# Patient Record
Sex: FEMALE | Race: WHITE | HISPANIC OR LATINO | Employment: UNEMPLOYED | URBAN - METROPOLITAN AREA
[De-identification: names, ages, dates, MRNs, and addresses within clinical notes are randomized per-mention and may not be internally consistent; named-entity substitution may affect disease eponyms.]

---

## 2021-01-12 ENCOUNTER — APPOINTMENT (OUTPATIENT)
Dept: ULTRASOUND IMAGING | Facility: HOSPITAL | Age: 26
End: 2021-01-12

## 2021-01-12 ENCOUNTER — HOSPITAL ENCOUNTER (EMERGENCY)
Facility: HOSPITAL | Age: 26
Discharge: HOME | End: 2021-01-12
Attending: EMERGENCY MEDICINE | Admitting: EMERGENCY MEDICINE

## 2021-01-12 VITALS
WEIGHT: 170 LBS | BODY MASS INDEX: 25.18 KG/M2 | DIASTOLIC BLOOD PRESSURE: 71 MMHG | RESPIRATION RATE: 19 BRPM | TEMPERATURE: 98.5 F | SYSTOLIC BLOOD PRESSURE: 137 MMHG | OXYGEN SATURATION: 100 % | HEART RATE: 89 BPM | HEIGHT: 69 IN

## 2021-01-12 DIAGNOSIS — Z34.91 FIRST TRIMESTER PREGNANCY: Primary | ICD-10-CM

## 2021-01-12 LAB
ABO GROUP BLD: NORMAL
ALBUMIN SERPL-MCNC: 4.6 G/DL (ref 3.5–5.2)
ALBUMIN/GLOB SERPL: 1.8 G/DL
ALP SERPL-CCNC: 68 U/L (ref 39–117)
ALT SERPL W P-5'-P-CCNC: 39 U/L (ref 1–33)
ANION GAP SERPL CALCULATED.3IONS-SCNC: 12.5 MMOL/L (ref 5–15)
AST SERPL-CCNC: 30 U/L (ref 1–32)
BASOPHILS # BLD AUTO: 0.04 10*3/MM3 (ref 0–0.2)
BASOPHILS NFR BLD AUTO: 0.3 % (ref 0–1.5)
BILIRUB SERPL-MCNC: 0.3 MG/DL (ref 0–1.2)
BILIRUB UR QL STRIP: NEGATIVE
BLD GP AB SCN SERPL QL: NEGATIVE
BUN SERPL-MCNC: 6 MG/DL (ref 6–20)
BUN/CREAT SERPL: 14.3 (ref 7–25)
CALCIUM SPEC-SCNC: 9.2 MG/DL (ref 8.6–10.5)
CHLORIDE SERPL-SCNC: 106 MMOL/L (ref 98–107)
CLARITY UR: CLEAR
CLUE CELLS SPEC QL WET PREP: ABNORMAL
CO2 SERPL-SCNC: 20.5 MMOL/L (ref 22–29)
COLOR UR: YELLOW
CREAT SERPL-MCNC: 0.42 MG/DL (ref 0.57–1)
DEPRECATED RDW RBC AUTO: 40.6 FL (ref 37–54)
EOSINOPHIL # BLD AUTO: 0.03 10*3/MM3 (ref 0–0.4)
EOSINOPHIL NFR BLD AUTO: 0.2 % (ref 0.3–6.2)
ERYTHROCYTE [DISTWIDTH] IN BLOOD BY AUTOMATED COUNT: 13.3 % (ref 12.3–15.4)
GFR SERPL CREATININE-BSD FRML MDRD: >150 ML/MIN/1.73
GLOBULIN UR ELPH-MCNC: 2.5 GM/DL
GLUCOSE SERPL-MCNC: 93 MG/DL (ref 65–99)
GLUCOSE UR STRIP-MCNC: NEGATIVE MG/DL
HCG INTACT+B SERPL-ACNC: NORMAL MIU/ML
HCG SERPL QL: POSITIVE
HCT VFR BLD AUTO: 39.7 % (ref 34–46.6)
HGB BLD-MCNC: 13.2 G/DL (ref 12–15.9)
HGB UR QL STRIP.AUTO: NEGATIVE
HOLD SPECIMEN: NORMAL
HYDATID CYST SPEC WET PREP: ABNORMAL
IMM GRANULOCYTES # BLD AUTO: 0.08 10*3/MM3 (ref 0–0.05)
IMM GRANULOCYTES NFR BLD AUTO: 0.6 % (ref 0–0.5)
KETONES UR QL STRIP: NEGATIVE
KOH PREP NAIL: NORMAL
LEUKOCYTE ESTERASE UR QL STRIP.AUTO: NEGATIVE
LIPASE SERPL-CCNC: 13 U/L (ref 13–60)
LYMPHOCYTES # BLD AUTO: 2.02 10*3/MM3 (ref 0.7–3.1)
LYMPHOCYTES NFR BLD AUTO: 14.1 % (ref 19.6–45.3)
MCH RBC QN AUTO: 27.7 PG (ref 26.6–33)
MCHC RBC AUTO-ENTMCNC: 33.2 G/DL (ref 31.5–35.7)
MCV RBC AUTO: 83.4 FL (ref 79–97)
MONOCYTES # BLD AUTO: 0.87 10*3/MM3 (ref 0.1–0.9)
MONOCYTES NFR BLD AUTO: 6.1 % (ref 5–12)
NEUTROPHILS NFR BLD AUTO: 11.32 10*3/MM3 (ref 1.7–7)
NEUTROPHILS NFR BLD AUTO: 78.7 % (ref 42.7–76)
NITRITE UR QL STRIP: NEGATIVE
NRBC BLD AUTO-RTO: 0 /100 WBC (ref 0–0.2)
NUMBER OF DOSES: NORMAL
PH UR STRIP.AUTO: 5.5 [PH] (ref 5–8)
PLATELET # BLD AUTO: 324 10*3/MM3 (ref 140–450)
PMV BLD AUTO: 10.2 FL (ref 6–12)
POTASSIUM SERPL-SCNC: 4.1 MMOL/L (ref 3.5–5.2)
PROT SERPL-MCNC: 7.1 G/DL (ref 6–8.5)
PROT UR QL STRIP: NEGATIVE
RBC # BLD AUTO: 4.76 10*6/MM3 (ref 3.77–5.28)
RH BLD: NEGATIVE
SODIUM SERPL-SCNC: 139 MMOL/L (ref 136–145)
SP GR UR STRIP: 1.01 (ref 1–1.03)
T VAGINALIS SPEC QL WET PREP: ABNORMAL
UROBILINOGEN UR QL STRIP: NORMAL
WBC # BLD AUTO: 14.36 10*3/MM3 (ref 3.4–10.8)
WBC SPEC QL WET PREP: ABNORMAL
WHOLE BLOOD HOLD SPECIMEN: NORMAL
WHOLE BLOOD HOLD SPECIMEN: NORMAL
YEAST GENITAL QL WET PREP: ABNORMAL

## 2021-01-12 PROCEDURE — 84703 CHORIONIC GONADOTROPIN ASSAY: CPT

## 2021-01-12 PROCEDURE — 87591 N.GONORRHOEAE DNA AMP PROB: CPT | Performed by: EMERGENCY MEDICINE

## 2021-01-12 PROCEDURE — 36415 COLL VENOUS BLD VENIPUNCTURE: CPT

## 2021-01-12 PROCEDURE — 76817 TRANSVAGINAL US OBSTETRIC: CPT

## 2021-01-12 PROCEDURE — 86901 BLOOD TYPING SEROLOGIC RH(D): CPT | Performed by: EMERGENCY MEDICINE

## 2021-01-12 PROCEDURE — 99284 EMERGENCY DEPT VISIT MOD MDM: CPT

## 2021-01-12 PROCEDURE — 87210 SMEAR WET MOUNT SALINE/INK: CPT | Performed by: EMERGENCY MEDICINE

## 2021-01-12 PROCEDURE — 93976 VASCULAR STUDY: CPT

## 2021-01-12 PROCEDURE — 85025 COMPLETE CBC W/AUTO DIFF WBC: CPT

## 2021-01-12 PROCEDURE — 81003 URINALYSIS AUTO W/O SCOPE: CPT

## 2021-01-12 PROCEDURE — 86850 RBC ANTIBODY SCREEN: CPT | Performed by: EMERGENCY MEDICINE

## 2021-01-12 PROCEDURE — 84702 CHORIONIC GONADOTROPIN TEST: CPT | Performed by: EMERGENCY MEDICINE

## 2021-01-12 PROCEDURE — 76815 OB US LIMITED FETUS(S): CPT

## 2021-01-12 PROCEDURE — 87220 TISSUE EXAM FOR FUNGI: CPT | Performed by: EMERGENCY MEDICINE

## 2021-01-12 PROCEDURE — 86900 BLOOD TYPING SEROLOGIC ABO: CPT | Performed by: EMERGENCY MEDICINE

## 2021-01-12 PROCEDURE — 87491 CHLMYD TRACH DNA AMP PROBE: CPT | Performed by: EMERGENCY MEDICINE

## 2021-01-12 PROCEDURE — 83690 ASSAY OF LIPASE: CPT

## 2021-01-12 PROCEDURE — 80053 COMPREHEN METABOLIC PANEL: CPT

## 2021-01-12 RX ORDER — SODIUM CHLORIDE 0.9 % (FLUSH) 0.9 %
10 SYRINGE (ML) INJECTION AS NEEDED
Status: DISCONTINUED | OUTPATIENT
Start: 2021-01-12 | End: 2021-01-12 | Stop reason: HOSPADM

## 2021-01-12 NOTE — ED PROVIDER NOTES
EMERGENCY DEPARTMENT ENCOUNTER    Room Number:  10/10  Date of encounter:  1/12/2021  PCP: Provider, No Known  Historian: Patient      HPI:  Chief Complaint: Pelvic pain      Context: Evie Ann is a 25 y.o. female who presents to the ED c/o low abdominal and pelvic pain that started yesterday.  She reports that she has not had a menstrual cycle in the last 2 months.  She reports she has taken 2 pregnancy tests and both of been positive.  She states she has a history of PCOS and was told she would not be able to be pregnant.  She denies any prior pregnancies.  She reports some normal white vaginal discharge.  She denies vaginal bleeding.  She reports nausea for the last several weeks.      PAST MEDICAL HISTORY  Active Ambulatory Problems     Diagnosis Date Noted   • No Active Ambulatory Problems     Resolved Ambulatory Problems     Diagnosis Date Noted   • No Resolved Ambulatory Problems     Past Medical History:   Diagnosis Date   • Polycystic disease, ovaries          PAST SURGICAL HISTORY  History reviewed. No pertinent surgical history.      FAMILY HISTORY  History reviewed. No pertinent family history.      SOCIAL HISTORY  Social History     Socioeconomic History   • Marital status: Single     Spouse name: Not on file   • Number of children: Not on file   • Years of education: Not on file   • Highest education level: Not on file   Tobacco Use   • Smoking status: Current Every Day Smoker   • Smokeless tobacco: Never Used   Substance and Sexual Activity   • Alcohol use: Yes     Frequency: Never     Comment: occ   • Drug use: Never   • Sexual activity: Yes     Partners: Male         ALLERGIES  Patient has no known allergies.        REVIEW OF SYSTEMS  Review of Systems   No upper abdominal pain, no diarrhea, no shortness of air, no chest pain, no palpitations  All systems reviewed and negative except for those discussed in HPI.       PHYSICAL EXAM    I have reviewed the triage vital signs and nursing  notes.    ED Triage Vitals [01/12/21 1451]   Temp Heart Rate Resp BP SpO2   98.5 °F (36.9 °C) 97 16 148/91 100 %      Temp src Heart Rate Source Patient Position BP Location FiO2 (%)   -- -- -- -- --       Physical Exam  GENERAL: Awake, alert, not distressed  HENT: nares patent  EYES: no scleral icterus  CV: regular rhythm, regular rate  RESPIRATORY: normal effort, lungs clear  ABDOMEN: soft, nondistended, minimally tender suprapubic area.  MUSCULOSKELETAL: no deformity  NEURO: alert, moves all extremities, follows commands  SKIN: warm, dry        LAB RESULTS  Recent Results (from the past 24 hour(s))   Comprehensive Metabolic Panel    Collection Time: 01/12/21  4:10 PM    Specimen: Blood   Result Value Ref Range    Glucose 93 65 - 99 mg/dL    BUN 6 6 - 20 mg/dL    Creatinine 0.42 (L) 0.57 - 1.00 mg/dL    Sodium 139 136 - 145 mmol/L    Potassium 4.1 3.5 - 5.2 mmol/L    Chloride 106 98 - 107 mmol/L    CO2 20.5 (L) 22.0 - 29.0 mmol/L    Calcium 9.2 8.6 - 10.5 mg/dL    Total Protein 7.1 6.0 - 8.5 g/dL    Albumin 4.60 3.50 - 5.20 g/dL    ALT (SGPT) 39 (H) 1 - 33 U/L    AST (SGOT) 30 1 - 32 U/L    Alkaline Phosphatase 68 39 - 117 U/L    Total Bilirubin 0.3 0.0 - 1.2 mg/dL    eGFR Non African Amer >150 >60 mL/min/1.73    Globulin 2.5 gm/dL    A/G Ratio 1.8 g/dL    BUN/Creatinine Ratio 14.3 7.0 - 25.0    Anion Gap 12.5 5.0 - 15.0 mmol/L   Lipase    Collection Time: 01/12/21  4:10 PM    Specimen: Blood   Result Value Ref Range    Lipase 13 13 - 60 U/L   hCG, Serum, Qualitative    Collection Time: 01/12/21  4:10 PM    Specimen: Blood   Result Value Ref Range    HCG Qualitative Positive (A) Negative   Light Blue Top    Collection Time: 01/12/21  4:10 PM   Result Value Ref Range    Extra Tube hold for add-on    Green Top (Gel)    Collection Time: 01/12/21  4:10 PM   Result Value Ref Range    Extra Tube Hold for add-ons.    Lavender Top    Collection Time: 01/12/21  4:10 PM   Result Value Ref Range    Extra Tube hold for add-on     CBC Auto Differential    Collection Time: 21  4:10 PM    Specimen: Blood   Result Value Ref Range    WBC 14.36 (H) 3.40 - 10.80 10*3/mm3    RBC 4.76 3.77 - 5.28 10*6/mm3    Hemoglobin 13.2 12.0 - 15.9 g/dL    Hematocrit 39.7 34.0 - 46.6 %    MCV 83.4 79.0 - 97.0 fL    MCH 27.7 26.6 - 33.0 pg    MCHC 33.2 31.5 - 35.7 g/dL    RDW 13.3 12.3 - 15.4 %    RDW-SD 40.6 37.0 - 54.0 fl    MPV 10.2 6.0 - 12.0 fL    Platelets 324 140 - 450 10*3/mm3    Neutrophil % 78.7 (H) 42.7 - 76.0 %    Lymphocyte % 14.1 (L) 19.6 - 45.3 %    Monocyte % 6.1 5.0 - 12.0 %    Eosinophil % 0.2 (L) 0.3 - 6.2 %    Basophil % 0.3 0.0 - 1.5 %    Immature Grans % 0.6 (H) 0.0 - 0.5 %    Neutrophils, Absolute 11.32 (H) 1.70 - 7.00 10*3/mm3    Lymphocytes, Absolute 2.02 0.70 - 3.10 10*3/mm3    Monocytes, Absolute 0.87 0.10 - 0.90 10*3/mm3    Eosinophils, Absolute 0.03 0.00 - 0.40 10*3/mm3    Basophils, Absolute 0.04 0.00 - 0.20 10*3/mm3    Immature Grans, Absolute 0.08 (H) 0.00 - 0.05 10*3/mm3    nRBC 0.0 0.0 - 0.2 /100 WBC   Urinalysis With Microscopic If Indicated (No Culture) - Urine, Clean Catch    Collection Time: 21  4:20 PM    Specimen: Urine, Clean Catch   Result Value Ref Range    Color, UA Yellow Yellow, Straw    Appearance, UA Clear Clear    pH, UA 5.5 5.0 - 8.0    Specific Gravity, UA 1.007 1.005 - 1.030    Glucose, UA Negative Negative    Ketones, UA Negative Negative    Bilirubin, UA Negative Negative    Blood, UA Negative Negative    Protein, UA Negative Negative    Leuk Esterase, UA Negative Negative    Nitrite, UA Negative Negative    Urobilinogen, UA 0.2 E.U./dL 0.2 - 1.0 E.U./dL   hCG, Quantitative, Pregnancy    Collection Time: 21  5:17 PM    Specimen: Blood   Result Value Ref Range    HCG Quantitative 120,822.00 mIU/mL    RhIg Evaluation    Collection Time: 21  5:17 PM    Specimen: Blood   Result Value Ref Range    ABO Type A     RH type Negative     Antibody Screen Negative    Doses of Rh Immune  Globulin    Collection Time: 01/12/21  5:17 PM    Specimen: Blood   Result Value Ref Range    Number of Doses Recommend 1 vial of RhIg    KOH Prep - Swab, Vagina    Collection Time: 01/12/21  6:07 PM    Specimen: Vagina; Swab   Result Value Ref Range    KOH Prep No yeast or hyphal elements seen No yeast or hyphal elements seen   Wet Prep, Genital - Swab, Vagina    Collection Time: 01/12/21  6:07 PM    Specimen: Vagina; Swab   Result Value Ref Range    YEAST No yeast seen No yeast seen    HYPHAL ELEMENTS No Hyphal elements seen No Hyphal elements seen    WBC'S 2+ WBC's seen (A) No WBC's seen    Clue Cells, Wet Prep No Clue cells seen No Clue cells seen    Trichomonas, Wet Prep No Trichomonas seen No Trichomonas seen       Ordered the above labs and independently reviewed the results.        RADIOLOGY  Us Ob Limited 1 + Fetuses    Result Date: 1/12/2021  US OB LIMITED 1 + FETUSES-  INDICATIONS: Pregnancy, pelvic pain  TECHNIQUE: Transabdominal and endovaginal pelvic ultrasound with Doppler assessment of the ovaries.  COMPARISON: None available  FINDINGS:  The gravid uterus measures 9.2 x 5.5 x 6.0 cm. A single intrauterine gestation is demonstrated with yolk sac measuring 3.8 mm, gestational sac measuring 2.5 cm, crown-rump length measuring 1.2 cm. Ultrasound estimated gestational age is 7 weeks, 4 days. Fetal heart rate was measured at 152 bpm. No subchorionic hematoma is noted. The cervix is closed, measures 3.5 cm. Minimal endocervical fluid noted.  The ovaries show normal vascularity. The right ovary measures 3.0 x 2.4 x 3.1 cm, and contains a 2.2 cm presumed corpus luteum cyst. Left ovary measures 2.5 x 1.3 x 2.4 cm, appears unremarkable  No significant pelvic free fluid.        Unremarkable live intrauterine gestation, as described.  This report was finalized on 1/12/2021 7:11 PM by Dr. Vamsi Franco M.D.        I ordered the above noted radiological studies. Reviewed by me and discussed with radiologist.   See dictation for official radiology interpretation.      PROCEDURES    Procedures      MEDICATIONS GIVEN IN ER    Medications - No data to display      PROGRESS, DATA ANALYSIS, CONSULTS, AND MEDICAL DECISION MAKING    All labs have been independently reviewed by me.  All radiology studies have been reviewed by me and discussed with radiologist dictating the report.   EKG's independently viewed and interpreted by me.  Discussion below represents my analysis of pertinent findings related to patient's condition, differential diagnosis, treatment plan and final disposition.        ED Course as of Jan 12 2218   Tue Jan 12, 2021   1805 Pelvic exam with closed cervix, scant white discharge, nontender    [TR]   1914 Received call regarding ultrasound results.  Single intrauterine pregnancy at 7 weeks and 4 days with a heart rate of 152.  No free fluid.    [TR]   1916 Reviewed work-up and finding with the patient.  Answered all questions.    [TR]      ED Course User Index  [TR] Goyo West MD           PPE: Both the patient and I wore a surgical mask throughout the entire patient encounter. I wore protective goggles.     AS OF 22:18 EST VITALS:    BP - 137/71  HR - 89  TEMP - 98.5 °F (36.9 °C)  O2 SATS - 100%        DIAGNOSIS  Final diagnoses:   First trimester pregnancy         DISPOSITION  Discharged home           Goyo West MD  01/12/21 3784

## 2021-01-12 NOTE — ED NOTES
patient from home with c/o abdominal pain that started 2 months ago that has progressively gotten worse over the last day. Patient is unsure if she is pregnant with last menstrual period 2 months ago. Denies any vaginal bleeding at this time.     Amarilys Perez RN  01/12/21 2334

## 2021-01-14 LAB
C TRACH RRNA SPEC QL NAA+PROBE: NEGATIVE
N GONORRHOEA RRNA SPEC QL NAA+PROBE: NEGATIVE

## 2021-02-09 ENCOUNTER — HOSPITAL ENCOUNTER (EMERGENCY)
Facility: HOSPITAL | Age: 26
Discharge: HOME | End: 2021-02-09
Attending: EMERGENCY MEDICINE | Admitting: EMERGENCY MEDICINE

## 2021-02-09 VITALS
HEART RATE: 77 BPM | DIASTOLIC BLOOD PRESSURE: 57 MMHG | TEMPERATURE: 98.8 F | RESPIRATION RATE: 18 BRPM | OXYGEN SATURATION: 100 % | SYSTOLIC BLOOD PRESSURE: 112 MMHG

## 2021-02-09 DIAGNOSIS — L24.4 IRRITANT CONTACT DERMATITIS DUE TO DRUG IN CONTACT WITH SKIN: Primary | ICD-10-CM

## 2021-02-09 PROCEDURE — 99282 EMERGENCY DEPT VISIT SF MDM: CPT

## 2021-02-09 NOTE — ED PROVIDER NOTES
EMERGENCY DEPARTMENT ENCOUNTER    Room Number:  34/34  Date of encounter:  2021  PCP: Provider, No Known  Historian: Patient       HPI:  Chief Complaint: Rash  A complete HPI/ROS/PMH/PSH/SH/FH are unobtainable due to: None    Context: Evie Shi is a 25 y.o. female who presents to the ED c/o rash.  Onset this morning she has been using a new cream to her lower abdomen to prevent stretch marks as she is currently almost 11 weeks pregnant.  .  She developed associated itching that is mild and constant.  Weeks is better or worse.  No associated fever, shortness of breath, swelling, vomiting, abdominal pain, diarrhea.      PAST MEDICAL HISTORY  Active Ambulatory Problems     Diagnosis Date Noted   • No Active Ambulatory Problems     Resolved Ambulatory Problems     Diagnosis Date Noted   • No Resolved Ambulatory Problems     Past Medical History:   Diagnosis Date   • Polycystic disease, ovaries          PAST SURGICAL HISTORY  No past surgical history on file.      FAMILY HISTORY  No family history on file.      SOCIAL HISTORY  Social History     Socioeconomic History   • Marital status: Single     Spouse name: Not on file   • Number of children: Not on file   • Years of education: Not on file   • Highest education level: Not on file   Tobacco Use   • Smoking status: Current Every Day Smoker   • Smokeless tobacco: Never Used   Substance and Sexual Activity   • Alcohol use: Yes     Frequency: Never     Comment: occ   • Drug use: Never   • Sexual activity: Yes     Partners: Male         ALLERGIES  Patient has no known allergies.        REVIEW OF SYSTEMS  Review of Systems     All systems reviewed and negative except for those discussed in HPI.       PHYSICAL EXAM    I have reviewed the triage vital signs and nursing notes.    ED Triage Vitals   Temp Heart Rate Resp BP SpO2   21 0851 21 0851 21 0851 21 0904 21 0851   98.8 °F (37.1 °C) 77 18 112/57 100 %      Temp src  Heart Rate Source Patient Position BP Location FiO2 (%)   -- -- -- -- --              Physical Exam  GENERAL: not distressed  HENT: nares patent  EYES: no scleral icterus  CV: regular rhythm, regular rate  RESPIRATORY: normal effort, clear to auscultation bilaterally  ABDOMEN: soft, nontender  MUSCULOSKELETAL: no deformity  NEURO: alert, moves all extremities, follows commands  SKIN: Erythematous papules to the patient's lower abdomen where she is applying the        LAB RESULTS  No results found for this or any previous visit (from the past 24 hour(s)).    Ordered the above labs and independently reviewed the results.        RADIOLOGY  No Radiology Exams Resulted Within Past 24 Hours    I ordered the above noted radiological studies. Reviewed by me and discussed with radiologist.  See dictation for official radiology interpretation.      PROCEDURES    Procedures      MEDICATIONS GIVEN IN ER    Medications - No data to display      PROGRESS, DATA ANALYSIS, CONSULTS, AND MEDICAL DECISION MAKING    All labs have been independently reviewed by me.  All radiology studies have been reviewed by me and discussed with radiologist dictating the report.   EKG's independently viewed and interpreted by me.  Discussion below represents my analysis of pertinent findings related to patient's condition, differential diagnosis, treatment plan and final disposition.    Patient presents with contact dermatitis the abdomen.  No evidence of anaphylaxis.  She is clinically well-appearing.  Patient also asked to have the sex of the baby determined.  I let her know this cannot be performed in the emergency department.           PPE: Both the patient and I wore a surgical mask throughout the entire patient encounter. I wore protective goggles.     AS OF 09:13 EST VITALS:    BP - 112/57  HR - 77  TEMP - 98.8 °F (37.1 °C)  O2 SATS - 100%        DIAGNOSIS  Final diagnoses:   Irritant contact dermatitis due to drug in contact with skin          DISPOSITION  DISCHARGE    FOLLOW-UP    Llama a cosme obsetetrico para hacer gabriel gustavo si no se te quiten la ronchas dentro de gabriel semana.             Medication List      No changes were made to your prescriptions during this visit.                  Eyad Forrest II, MD  02/09/21 0980

## 2021-02-09 NOTE — ED NOTES
Patient from home with c/o rash on her stomach. Patient is 11 weeks pregnant and has recently started using a new topical cream and is unsure if she is allergic to the cream. Patient is not having any pain at this moment. No fever reported.     Amarilys Perez RN  02/09/21 1015

## 2021-03-12 ENCOUNTER — HOSPITAL ENCOUNTER (EMERGENCY)
Facility: HOSPITAL | Age: 26
Discharge: HOME | End: 2021-03-12
Attending: OBSTETRICS & GYNECOLOGY | Admitting: OBSTETRICS & GYNECOLOGY

## 2021-03-12 ENCOUNTER — APPOINTMENT (OUTPATIENT)
Dept: ULTRASOUND IMAGING | Facility: HOSPITAL | Age: 26
End: 2021-03-12

## 2021-03-12 ENCOUNTER — HOSPITAL ENCOUNTER (EMERGENCY)
Facility: HOSPITAL | Age: 26
End: 2021-03-12

## 2021-03-12 ENCOUNTER — HOSPITAL ENCOUNTER (EMERGENCY)
Facility: HOSPITAL | Age: 26
Discharge: HOME | End: 2021-03-12
Attending: EMERGENCY MEDICINE | Admitting: EMERGENCY MEDICINE

## 2021-03-12 ENCOUNTER — HOSPITAL ENCOUNTER (OUTPATIENT)
Facility: HOSPITAL | Age: 26
End: 2021-03-12
Attending: OBSTETRICS & GYNECOLOGY | Admitting: OBSTETRICS & GYNECOLOGY

## 2021-03-12 VITALS
TEMPERATURE: 97.8 F | DIASTOLIC BLOOD PRESSURE: 58 MMHG | SYSTOLIC BLOOD PRESSURE: 109 MMHG | BODY MASS INDEX: 25.1 KG/M2 | OXYGEN SATURATION: 100 % | HEIGHT: 69 IN | RESPIRATION RATE: 16 BRPM | HEART RATE: 82 BPM

## 2021-03-12 VITALS — OXYGEN SATURATION: 97 % | RESPIRATION RATE: 16 BRPM | HEART RATE: 97 BPM

## 2021-03-12 DIAGNOSIS — O26.892 ABDOMINAL PAIN DURING PREGNANCY IN SECOND TRIMESTER: Primary | ICD-10-CM

## 2021-03-12 DIAGNOSIS — R10.9 ABDOMINAL PAIN DURING PREGNANCY IN SECOND TRIMESTER: Primary | ICD-10-CM

## 2021-03-12 LAB
ALBUMIN SERPL-MCNC: 4.1 G/DL (ref 3.5–5.2)
ALBUMIN/GLOB SERPL: 1.2 G/DL
ALP SERPL-CCNC: 71 U/L (ref 39–117)
ALT SERPL W P-5'-P-CCNC: 26 U/L (ref 1–33)
ANION GAP SERPL CALCULATED.3IONS-SCNC: 7.1 MMOL/L (ref 5–15)
AST SERPL-CCNC: 25 U/L (ref 1–32)
BACTERIA UR QL AUTO: ABNORMAL /HPF
BASOPHILS # BLD AUTO: 0.04 10*3/MM3 (ref 0–0.2)
BASOPHILS NFR BLD AUTO: 0.3 % (ref 0–1.5)
BILIRUB SERPL-MCNC: 0.2 MG/DL (ref 0–1.2)
BILIRUB UR QL STRIP: NEGATIVE
BUN SERPL-MCNC: 7 MG/DL (ref 6–20)
BUN/CREAT SERPL: 14.6 (ref 7–25)
CALCIUM SPEC-SCNC: 8.8 MG/DL (ref 8.6–10.5)
CHLORIDE SERPL-SCNC: 105 MMOL/L (ref 98–107)
CLARITY UR: CLEAR
CO2 SERPL-SCNC: 23.9 MMOL/L (ref 22–29)
COLOR UR: YELLOW
CREAT SERPL-MCNC: 0.48 MG/DL (ref 0.57–1)
DEPRECATED RDW RBC AUTO: 39.9 FL (ref 37–54)
EOSINOPHIL # BLD AUTO: 0.1 10*3/MM3 (ref 0–0.4)
EOSINOPHIL NFR BLD AUTO: 0.8 % (ref 0.3–6.2)
ERYTHROCYTE [DISTWIDTH] IN BLOOD BY AUTOMATED COUNT: 12.9 % (ref 12.3–15.4)
GFR SERPL CREATININE-BSD FRML MDRD: >150 ML/MIN/1.73
GLOBULIN UR ELPH-MCNC: 3.4 GM/DL
GLUCOSE SERPL-MCNC: 81 MG/DL (ref 65–99)
GLUCOSE UR STRIP-MCNC: NEGATIVE MG/DL
HCG INTACT+B SERPL-ACNC: NORMAL MIU/ML
HCT VFR BLD AUTO: 40.4 % (ref 34–46.6)
HGB BLD-MCNC: 13.3 G/DL (ref 12–15.9)
HGB UR QL STRIP.AUTO: NEGATIVE
HYALINE CASTS UR QL AUTO: ABNORMAL /LPF
IMM GRANULOCYTES # BLD AUTO: 0.06 10*3/MM3 (ref 0–0.05)
IMM GRANULOCYTES NFR BLD AUTO: 0.5 % (ref 0–0.5)
KETONES UR QL STRIP: NEGATIVE
LEUKOCYTE ESTERASE UR QL STRIP.AUTO: ABNORMAL
LYMPHOCYTES # BLD AUTO: 2.04 10*3/MM3 (ref 0.7–3.1)
LYMPHOCYTES NFR BLD AUTO: 16.3 % (ref 19.6–45.3)
MCH RBC QN AUTO: 28.4 PG (ref 26.6–33)
MCHC RBC AUTO-ENTMCNC: 32.9 G/DL (ref 31.5–35.7)
MCV RBC AUTO: 86.3 FL (ref 79–97)
MONOCYTES # BLD AUTO: 0.64 10*3/MM3 (ref 0.1–0.9)
MONOCYTES NFR BLD AUTO: 5.1 % (ref 5–12)
NEUTROPHILS NFR BLD AUTO: 77 % (ref 42.7–76)
NEUTROPHILS NFR BLD AUTO: 9.6 10*3/MM3 (ref 1.7–7)
NITRITE UR QL STRIP: NEGATIVE
NRBC BLD AUTO-RTO: 0 /100 WBC (ref 0–0.2)
PH UR STRIP.AUTO: 7 [PH] (ref 5–8)
PLATELET # BLD AUTO: 275 10*3/MM3 (ref 140–450)
PMV BLD AUTO: 10.4 FL (ref 6–12)
POTASSIUM SERPL-SCNC: 3.8 MMOL/L (ref 3.5–5.2)
PROT SERPL-MCNC: 7.5 G/DL (ref 6–8.5)
PROT UR QL STRIP: NEGATIVE
RBC # BLD AUTO: 4.68 10*6/MM3 (ref 3.77–5.28)
RBC # UR: ABNORMAL /HPF
REF LAB TEST METHOD: ABNORMAL
SODIUM SERPL-SCNC: 136 MMOL/L (ref 136–145)
SP GR UR STRIP: 1.01 (ref 1–1.03)
SQUAMOUS #/AREA URNS HPF: ABNORMAL /HPF
UROBILINOGEN UR QL STRIP: ABNORMAL
WBC # BLD AUTO: 12.48 10*3/MM3 (ref 3.4–10.8)
WBC UR QL AUTO: ABNORMAL /HPF

## 2021-03-12 PROCEDURE — 84702 CHORIONIC GONADOTROPIN TEST: CPT | Performed by: NURSE PRACTITIONER

## 2021-03-12 PROCEDURE — 76805 OB US >/= 14 WKS SNGL FETUS: CPT

## 2021-03-12 PROCEDURE — 99283 EMERGENCY DEPT VISIT LOW MDM: CPT

## 2021-03-12 PROCEDURE — 80053 COMPREHEN METABOLIC PANEL: CPT | Performed by: NURSE PRACTITIONER

## 2021-03-12 PROCEDURE — 85025 COMPLETE CBC W/AUTO DIFF WBC: CPT | Performed by: NURSE PRACTITIONER

## 2021-03-12 PROCEDURE — 81001 URINALYSIS AUTO W/SCOPE: CPT | Performed by: NURSE PRACTITIONER

## 2021-03-12 NOTE — ED PROVIDER NOTES
EMERGENCY DEPARTMENT ENCOUNTER    Room Number:  43/43  Date of encounter:  3/12/2021  PCP: Provider, No Known  Historian: Patient using ER RN as       PPE    Patient was placed in face mask in first look. Patient was wearing facemask when I entered the room and throughout our encounter. I wore full protective equipment throughout this patient encounter including a face mask, and gloves. Hand hygiene was performed before donning protective equipment and after removal when leaving the room.        HPI:  Chief Complaint: Abdominal cramping in pregnancy  A complete HPI/ROS/PMH/PSH/SH/FH are unobtainable due to: Nothing    Context: Evie Shi is a 25 y.o. female  1, 15 weeks pregnant who arrives to the ED via private vehicle.  Patient presents with c/o mild, constant, abdominal cramping that began last night.  Patient denies fever, chills, nausea, vomiting, vaginal bleeding or vaginal discharge, dysuria or any other symptoms.  Patient states that she was on Keflex last week for urinary tract infection.  Patient states that her OB is at the [facility].  Patient states that nothing makes the symptoms better and nothing worsens symptoms.          PAST MEDICAL HISTORY  Active Ambulatory Problems     Diagnosis Date Noted   • No Active Ambulatory Problems     Resolved Ambulatory Problems     Diagnosis Date Noted   • No Resolved Ambulatory Problems     Past Medical History:   Diagnosis Date   • Polycystic disease, ovaries          PAST SURGICAL HISTORY  No past surgical history on file.      FAMILY HISTORY  No family history on file.      SOCIAL HISTORY  Social History     Socioeconomic History   • Marital status: Single     Spouse name: Not on file   • Number of children: Not on file   • Years of education: Not on file   • Highest education level: Not on file   Tobacco Use   • Smoking status: Current Every Day Smoker   • Smokeless tobacco: Never Used   Substance and  Patient received to [address] [place] [address] room # 14  Ambulatory from [facility]. Patient scheduled for PPM today with Dr Maria Del Rosario Hutchison. Procedure reviewed & questions answered, voiced good understanding consent obtained & placed on chart. All medications and medical history reviewed. Will prep patient per orders. Patient & family updated on plan of care. The patient has a fraility score of 3-MANAGING WELL, based on patient A&Ox3, patient able to ambulate to room without difficulty. Sexual Activity   • Alcohol use: Yes     Comment: occ   • Drug use: Never   • Sexual activity: Yes     Partners: Male         ALLERGIES  Patient has no known allergies.        REVIEW OF SYSTEMS  Review of Systems     All systems reviewed and negative except for those discussed in HPI.        PHYSICAL EXAM    ED Triage Vitals   Temp Heart Rate Resp BP SpO2   03/12/21 1256 03/12/21 1256 03/12/21 1256 03/12/21 1332 03/12/21 1256   97.8 °F (36.6 °C) 96 18 110/73 96 %       Physical Exam  GENERAL: Well appearing, non-toxic appearing, not distressed  HENT: normocephalic, atraumatic  EYES: no scleral icterus, PERRL  CV: regular rhythm, regular rate, no murmur  RESPIRATORY: normal effort, CTAB  ABDOMEN: soft, normal bowel sounds, lower abdominal tenderness, no rebound, guarding or rigidity  Normal external female genitalia.  Vaginal vault normal. No bleeding or discharge. No adnexal tenderness. No cervical motion tenderness. Cervical os closed.  Chaperone, Lenora, RN at bedside during exam.  MUSCULOSKELETAL: no deformity  NEURO: alert, moves all extremities, follows commands, mental status normal/baseline  SKIN: warm, dry, no rash   Psych: Appropriate mood and affect  Nursing notes and vital signs reviewed      LAB RESULTS  Recent Results (from the past 24 hour(s))   Comprehensive Metabolic Panel    Collection Time: 03/12/21  3:31 PM    Specimen: Blood   Result Value Ref Range    Glucose 81 65 - 99 mg/dL    BUN 7 6 - 20 mg/dL    Creatinine 0.48 (L) 0.57 - 1.00 mg/dL    Sodium 136 136 - 145 mmol/L    Potassium 3.8 3.5 - 5.2 mmol/L    Chloride 105 98 - 107 mmol/L    CO2 23.9 22.0 - 29.0 mmol/L    Calcium 8.8 8.6 - 10.5 mg/dL    Total Protein 7.5 6.0 - 8.5 g/dL    Albumin 4.10 3.50 - 5.20 g/dL    ALT (SGPT) 26 1 - 33 U/L    AST (SGOT) 25 1 - 32 U/L    Alkaline Phosphatase 71 39 - 117 U/L    Total Bilirubin 0.2 0.0 - 1.2 mg/dL    eGFR Non African Amer >150 >60 mL/min/1.73    Globulin 3.4 gm/dL    A/G Ratio 1.2 g/dL     BUN/Creatinine Ratio 14.6 7.0 - 25.0    Anion Gap 7.1 5.0 - 15.0 mmol/L   hCG, Quantitative, Pregnancy    Collection Time: 03/12/21  3:31 PM    Specimen: Blood   Result Value Ref Range    HCG Quantitative 67,081.00 mIU/mL   CBC Auto Differential    Collection Time: 03/12/21  3:31 PM    Specimen: Blood   Result Value Ref Range    WBC 12.48 (H) 3.40 - 10.80 10*3/mm3    RBC 4.68 3.77 - 5.28 10*6/mm3    Hemoglobin 13.3 12.0 - 15.9 g/dL    Hematocrit 40.4 34.0 - 46.6 %    MCV 86.3 79.0 - 97.0 fL    MCH 28.4 26.6 - 33.0 pg    MCHC 32.9 31.5 - 35.7 g/dL    RDW 12.9 12.3 - 15.4 %    RDW-SD 39.9 37.0 - 54.0 fl    MPV 10.4 6.0 - 12.0 fL    Platelets 275 140 - 450 10*3/mm3    Neutrophil % 77.0 (H) 42.7 - 76.0 %    Lymphocyte % 16.3 (L) 19.6 - 45.3 %    Monocyte % 5.1 5.0 - 12.0 %    Eosinophil % 0.8 0.3 - 6.2 %    Basophil % 0.3 0.0 - 1.5 %    Immature Grans % 0.5 0.0 - 0.5 %    Neutrophils, Absolute 9.60 (H) 1.70 - 7.00 10*3/mm3    Lymphocytes, Absolute 2.04 0.70 - 3.10 10*3/mm3    Monocytes, Absolute 0.64 0.10 - 0.90 10*3/mm3    Eosinophils, Absolute 0.10 0.00 - 0.40 10*3/mm3    Basophils, Absolute 0.04 0.00 - 0.20 10*3/mm3    Immature Grans, Absolute 0.06 (H) 0.00 - 0.05 10*3/mm3    nRBC 0.0 0.0 - 0.2 /100 WBC   Urinalysis With Microscopic If Indicated (No Culture) - Urine, Clean Catch    Collection Time: 03/12/21  3:37 PM    Specimen: Urine, Clean Catch   Result Value Ref Range    Color, UA Yellow Yellow, Straw    Appearance, UA Clear Clear    pH, UA 7.0 5.0 - 8.0    Specific Gravity, UA 1.011 1.005 - 1.030    Glucose, UA Negative Negative    Ketones, UA Negative Negative    Bilirubin, UA Negative Negative    Blood, UA Negative Negative    Protein, UA Negative Negative    Leuk Esterase, UA Trace (A) Negative    Nitrite, UA Negative Negative    Urobilinogen, UA 0.2 E.U./dL 0.2 - 1.0 E.U./dL   Urinalysis, Microscopic Only - Urine, Clean Catch    Collection Time: 03/12/21  3:37 PM    Specimen: Urine, Clean Catch   Result  Value Ref Range    RBC, UA 3-5 (A) None Seen, 0-2 /HPF    WBC, UA 3-5 (A) None Seen, 0-2 /HPF    Bacteria, UA None Seen None Seen /HPF    Squamous Epithelial Cells, UA 7-12 (A) None Seen, 0-2 /HPF    Hyaline Casts, UA 0-2 None Seen /LPF    Methodology Automated Microscopy        Ordered the above labs and independently reviewed the results.      RADIOLOGY  US [facility]    Result Date: 3/12/2021  PAT NAME: NILSA MONROY [facility] REC#: [id number] BIRTH DA: 1995 PAT GEND: F ACCOUNT#: [id number] PAT TYPE: E EXAM ROSETTA: [id number] REF PHYS RUTH SALAS ACCESSION [id number] Patient Status ============ Outpatient Indication ======== Abdominal cramping. No bleeding or discharge per patient. History ====== Previous Outcomes  1 Para 0 Method ======= Transabdominal ultrasound examination. View: Suboptimal view: limited by early gestational age Pregnancy ========= Martins pregnancy. Number of fetuses: 1 Dating ====== Method of dating: based on stated REBECCA GA by prior assessment 15 w + 3 d REBECCA by prior assessment: 2021 Ultrasound examination on: 3/12/2021 GA by U/S based upon: AC, BPD, Femur, HC GA by U/S 16 w + 3 d REBECCA by U/S: 2021 Assigned: based on stated REBECCA, selected on 2021 Assigned GA 15 w + 3 d Assigned REBECCA: 2021 Pregnancy length 280 d Fetal Biometry ============ Standard BPD 33.2 mm 16w 2d        84%        Hadlock OFD 44.6 mm 17w 1d        95%        [place] .0 mm 16w 4d        91%        Hadlock Cerebellum tr 18.5 mm 18w 1d        >99%        Hill .0 mm 17w 1d        95%        Hadlock Femur 19.9 mm 15w 6d        66%        Hadlock Humerus 18.3 mm 15w 2d        50%        [place] HC / AC 1.14         10%        Hadlock  g EFW (lb) 0 lb EFW (oz) 6 oz EFW by: Nathan (BPD-HC-AC-FL) Extended CM 2.9 mm         22%        Nicolaides Nasal bone 4.1 mm Head / Face / Neck Cephalic index 0.74         6%        Nicolaides Extremities /  Bony Struc FL / BPD 0.60         54%        Hadlock FL / HC 0.15         16%        Hadlock FL / AC 0.17         7%        Hadlock Other Structures  bpm General Evaluation ================ Cardiac activity present.  bpm. Fetal movements present. Presentation cephalic. Placenta posterior. Umbilical cord Cord vessels: 3 vessel cord. Insertion site: placental insertion: normal. Amniotic fluid Amount of AF: normal. MVP 3.8 cm. Fetal Anatomy ============ Cranium: suboptimal Choroid plexus: Appears normal Cavum septi pellucidi: not visualized Cerebellum: Appears normal Cisterna magna: Appears normal Head / Neck Rt lateral ventricle: suboptimal Lt lateral ventricle: suboptimal Rt choroid plexus: Appears normal Lt choroid plexus: Appears normal Lips: not visualized Profile: Appears normal Nose: not visualized Face Palate: suboptimal Maxilla: suboptimal Mandible: suboptimal Orbits: Normal 4-chamber view: suboptimal RVOT view: suboptimal LVOT view: suboptimal Heart / Thorax Aortic arch view: suboptimal Ductal arch view: suboptimal SVC: not visualized IVC: not visualized 3-vessel view: suboptimal 3-vessel-trachea view: suboptimal Cord insertion: Appears normal Stomach: Appears normal Kidneys: suboptimal Bladder: Appears normal Genitals: Appears normal Cervical spine: Appears normal Thoracic spine: Appears normal Lumbar spine: Appears normal Sacral spine: Appears normal Arms: Appears normal Legs: Appears normal Rt arm: Appears normal Lt arm: Appears normal Rt upper arm: Appears normal Rt forearm: Appears normal Rt hand: Appears normal Lt upper arm: Appears normal Lt forearm: Appears normal Lt hand: Appears normal Rt leg: Appears normal Lt leg: Appears normal Rt upper leg: Appears normal Rt lower leg: Appears normal Rt foot: Appears normal Lt upper leg: Appears normal Lt lower leg: Appears normal Lt foot: Appears normal Gender: male Gender: normal Wants to know gender: yes Maternal Structures ================  Uterus / Cervix Cervix: Visualized Approach: Transabdominal Cervical length 49.1 mm Funneling: Funneling absent Cerclage: Cervical cerclage absent Other: Placental edge 3.79 cm from internal os. Ovaries / Tubes / Adnexa Rt ovary D1 38.1 mm Rt ovary D2 26.8 mm Rt ovary D3 19.2 mm Rt ovary Vol 10.3 cm³ Lt ovary D1 35.0 mm Lt ovary D2 29.3 mm Lt ovary D3 15.90 mm Lt ovary Vol 8.5 cm³ Impression ========= Biometry and growth are consistent with the established REBECCA of 21. Biometry is 7 days discrepant with (ahead of) the LMP based REBECCA. The amniotic fluid quantity is adequate. Limited views of the anatomy did not detect abnormality within the limitations imposed by the gestational age, fetal position, transmission, and poor acoustic windows. Additional views are needed of numerous organs. Even after a normal ultrasound exam there remains a residual risk the fetus may have undetected anomalies. Placenta previa is not present. Due to exam limitations, transabdominal views of cervix poorly reflect cervical length. Transvaginal cervical length measurement is gold standard. If  labor is a concern, transvaginal cervical length measurement is indicated. Recommendation =============== Transvaginal cervical length measurement if differential diagnosis of abdominal cramping includes  labor. MSAFP screen for ONTD at 15-16 weeks gestation. Aneuploidy and carrier screening as clinically indicated by patient counseling. Complete anatomic survey and transvaginal ultrasound cervical length at 18-20 weeks gestation. ED care provider was provided verbal report and above recommendation for TV measurement of cervical length. Coding ====== Description: [id number] Intermediate Ultrasound Sonographer: Lexi Grullon RDMS Physician: Jsoe Arcos MD, FACOG Electronically signed by: Jose Arcos MD, FACOG at:  16:11      I ordered the above noted radiological studies and viewed the images on the PACS system.          MEDICAL RECORD REVIEW  Medical records reviewed in epic,, patient was here  and at that time was told she was pregnant.   RhIg Evaluation  Order: [id number]  Status:  Edited Result - FINAL   Visible to patient:  No (not released) Next appt:  None  Specimen Information: Blood        Component 1 mo ago   ABO Type A    RH type Negative    Antibody Screen Negative    Resulting Agency  [facility]         Specimen Collected: 21 17:17               PROCEDURES    Procedures        DIFFERENTIAL DIAGNOSIS  Differential diagnosis include but are not limited to the following: Abdominal pain in pregnancy, urinary tract infection, ectopic pregnancy      PROGRESS, DATA ANALYSIS, CONSULTS, AND MEDICAL DECISION MAKING        ED Course as of Mar 12 1939   Fri Mar 12, 2021   1404 Discussed pertinent information from history and physical exam with patient.  Discussed differential diagnosis and plan for ED evaluation/work-up and treatment including labs, urine and ultrasound.  All questions answered.  Patient is agreeable with this plan.        [MS]   1430 Reviewed pt's history and workup with Dr. Gifford.  After a bedside evaluation, they agree with the plan of care.          [MS]   1545 Plan to discharge patient home if labs and ultrasound are normal.  Discussed with patient, she should follow up with her OB/GYN next week and was given strict return to ER precautions.        [MS]   1602 14.36 one month ago   WBC(!): 12.48 [WH]      ED Course User Index  [MS] Marge Manuel, RICHARD  [WH] Abe Gifford MD     Discussed plan for discharge, as there is no emergent indication for admission. Pt/family is agreeable and understands need for follow up and repeat testing.  Pt is aware that discharge does not mean that nothing is wrong but it indicates no emergency is present that requires admission and they must continue care with follow-up as given below or physician of their choice.   Patient/Family  voiced understanding of above instructions.  Patient discharged in stable condition.    DIAGNOSIS  Final diagnoses:   Abdominal pain during pregnancy in second trimester       FOLLOW UP   Your OB/GYN      Call Monday to schedule an appointment      RX     Medication List      No changes were made to your prescriptions during this visit.           MEDICATIONS GIVEN IN ED    Medications - No data to display        COURSE & MEDICAL DECISION MAKING  Any/All labs and Any/All Imaging studies that were ordered were reviewed and are noted above.  Results were reviewed/discussed with the patient and they were also made aware of online access.    Pt also made aware that some labs, such as cultures, will not be resulted during ER visit and follow up with PMD is necessary.        Marge Manuel, APRN  03/12/21 1939

## 2021-03-12 NOTE — DISCHARGE INSTRUCTIONS
Tylenol as needed for pain  Increase fluids  Follow-up with your OB/GYN, call Monday to get an appointment scheduled  Return to the ER for fever, chills, nausea, vomiting, worsening abdominal pain, vaginal bleeding or any new or worsening symptoms

## 2021-03-12 NOTE — ED TRIAGE NOTES
Pt states that she is 15 weeks pregnant and is having RLQ abdominal pain. Pt denies any N/V, bleeding, or abnormal discharge. Patient masked at arrival and triage staff wore all appropriate PPE during entire encounter with patient.

## 2021-03-12 NOTE — ED PROVIDER NOTES
I have supervised the care provided by the midlevel provider.    We have discussed this patient's history, physical exam, and treatment plan.   I have reviewed the note and have personally examined the patient and agree with the plan of care.  See attached attending note.  My personal findings are below:    Patient complains of lower abdominal cramping since last night.  She is currently 15 weeks pregnant.  Denies nausea, vomiting, fever, chills, dysuria, or vaginal bleeding.  She was recently treated for a UTI.    On exam: Awake and alert.  Heart is regular rate and rhythm.  Lungs are clear bilaterally.  Abdomen is gravid.  There is mild right lower quadrant and suprapubic tenderness without rebound or guarding.  No CVA tenderness.    Plan is to obtain labs and ultrasound.     Abe Gifford MD  03/12/21 1465

## 2021-03-12 NOTE — ED NOTES
"Pt arrives from home with c/o abd pain and \"ovary pain\" & tachycardia since last night. Pt is around 15 weeks pregnant, 1st pregnancy. Was seen in L&D ER earlier today. Denies any vaginal bleeding or discharge. Pt a&ox4, abc's intact, NAD noted. Pt is Liberian speaking,  ID [id number], Ilan.      Pt noted to have mask on when this RN entered the room.  This RN wore appropriate PPE throughout our encounter. Hand hygiene performed upon entering and exiting room.       Eunice Guillen, RN  03/12/21 1340    "

## 2021-05-16 ENCOUNTER — HOSPITAL ENCOUNTER (EMERGENCY)
Facility: HOSPITAL | Age: 26
Discharge: HOME | End: 2021-05-16
Attending: OBSTETRICS & GYNECOLOGY | Admitting: OBSTETRICS & GYNECOLOGY

## 2021-05-16 ENCOUNTER — HOSPITAL ENCOUNTER (OUTPATIENT)
Facility: HOSPITAL | Age: 26
End: 2021-05-16
Attending: OBSTETRICS & GYNECOLOGY | Admitting: OBSTETRICS & GYNECOLOGY

## 2021-05-16 VITALS
DIASTOLIC BLOOD PRESSURE: 64 MMHG | SYSTOLIC BLOOD PRESSURE: 109 MMHG | BODY MASS INDEX: 25.76 KG/M2 | WEIGHT: 170 LBS | OXYGEN SATURATION: 97 % | HEIGHT: 68 IN | TEMPERATURE: 98.7 F | RESPIRATION RATE: 16 BRPM | HEART RATE: 99 BPM

## 2021-05-16 LAB
BILIRUB UR QL STRIP: NEGATIVE
BLOODY SPECIMEN?: NO
CLARITY UR: CLEAR
COLOR UR: YELLOW
FIBRONECTIN FETAL VAG QL: NEGATIVE
GLUCOSE UR STRIP-MCNC: NEGATIVE MG/DL
HGB UR QL STRIP.AUTO: NEGATIVE
KETONES UR QL STRIP: NEGATIVE
LEUKOCYTE ESTERASE UR QL STRIP.AUTO: NEGATIVE
NITRITE UR QL STRIP: NEGATIVE
PH UR STRIP.AUTO: 7 [PH] (ref 5–8)
PROT UR QL STRIP: NEGATIVE
SP GR UR STRIP: <1.005 (ref 1–1.03)
UROBILINOGEN UR QL STRIP: ABNORMAL

## 2021-05-16 PROCEDURE — 82731 ASSAY OF FETAL FIBRONECTIN: CPT | Performed by: OBSTETRICS & GYNECOLOGY

## 2021-05-16 PROCEDURE — 99284 EMERGENCY DEPT VISIT MOD MDM: CPT | Performed by: OBSTETRICS & GYNECOLOGY

## 2021-05-16 PROCEDURE — 81003 URINALYSIS AUTO W/O SCOPE: CPT | Performed by: OBSTETRICS & GYNECOLOGY

## 2021-05-16 PROCEDURE — 59025 FETAL NON-STRESS TEST: CPT | Performed by: OBSTETRICS & GYNECOLOGY

## 2021-05-16 RX ORDER — PRENATAL VIT NO.126/IRON/FOLIC 28MG-0.8MG
TABLET ORAL DAILY
COMMUNITY

## 2021-05-16 NOTE — OBED NOTES
"GURJIT Note OB        Patient Name: Evie Shi  YOB: 1995  MRN: [id number]  Admission Date: 2021  1:32 AM  Date of Service: 2021    Chief Complaint: Abdominal Pain (GURJIT: c/o right \"ovary pain\" and chest pain that wraps around the back.  +FM.  )        Subjective     Evie Shi is a 25 y.o. female  at 26w0d with Estimated Date of Delivery: 21 who presents with the chief complaint listed above.  She sees Cheryl Velasco MD for her prenatal care. Her pregnancy has been complicated by:  uncomplicated per patient.    Patient is Bahraini speaking only.  She receives her prenatal care at [facility] and records are unavailable.  She arrives her this evening with complaint of pain on the right side of her uterus in the right lower quadrant.  She states it wraps around to her mid-upper back and points at her spine as the location of it.  She also states she has some discomfort in her epigastric region but denies nausea, vomiting, fever, or chills.      She describes fetal movement as normal.  She denies rupture of membranes.  She denies vaginal bleeding. She is not feeling contractions.          Objective   There are no problems to display for this patient.       OB History    Para Term  AB Living   1 0 0 0 0 0   SAB TAB Ectopic Molar Multiple Live Births   0 0 0 0 0 0      # Outcome Date GA Lbr Dean/2nd Weight Sex Delivery Anes PTL Lv   1 Current                 Past Medical History:   Diagnosis Date   • Polycystic disease, ovaries        No past surgical history on file.    No current facility-administered medications on file prior to encounter.     Current Outpatient Medications on File Prior to Encounter   Medication Sig Dispense Refill   • prenatal vitamin (prenatal, CLASSIC, vitamin) tablet Take  by mouth Daily.         No Known Allergies    No family history on file.    Social History     Socioeconomic History   • Marital " "status: Single     Spouse name: Not on file   • Number of children: Not on file   • Years of education: Not on file   • Highest education level: Not on file   Tobacco Use   • Smoking status: Current Every Day Smoker   • Smokeless tobacco: Never Used   Substance and Sexual Activity   • Alcohol use: Yes     Comment: occ   • Drug use: Never   • Sexual activity: Yes     Partners: Male           Review of Systems   Constitutional: Negative for chills, fatigue and fever.   HENT: Negative for congestion, rhinorrhea and sore throat.    Eyes: Negative for visual disturbance.   Respiratory: Negative.    Cardiovascular: Negative.    Gastrointestinal: Positive for abdominal pain. Negative for constipation, diarrhea, nausea and vomiting.   Genitourinary: Negative for difficulty urinating, dyspareunia, dysuria, frequency, genital sores, hematuria, pelvic pain, urgency, vaginal bleeding, vaginal discharge and vaginal pain.   Neurological: Negative for dizziness, seizures, light-headedness and headaches.   Psychiatric/Behavioral: Negative for sleep disturbance. The patient is not nervous/anxious.           PHYSICAL EXAM:      VITAL SIGNS:  Vitals:    05/16/21 0152 05/16/21 0156   BP:  109/64   Pulse:  99   Resp: 16    Temp: 98.7 °F (37.1 °C)    TempSrc: Oral    SpO2: 97%    Height: 172 cm (67.72\")         FHT'S:                   Baseline:  130 BPM  Variability:  Moderate = 6 - 25 BPM  Accelerations:  15 x 15 accelerations present     Decelerations:  absent  Contractions:   present     Interpretation:    Reactive NST, CAT 1 tracing        PHYSICAL EXAM:    General: well developed; well nourished  no acute distress   Heart: Not performed.   Lungs  : breathing is unlabored     Abdomen: soft, non-tender; no masses  no umbilical or inguinal hernias are present  no hepato-splenomegaly       Cervix: was checked (by me): 0 cm / 1 % / -3  Cervical Dilation (cm): 0  Cervical Position: posterior   Contractions: irregular        Extremities: " "peripheral pulses normal, no pedal edema, no clubbing or cyanosis      LABS AND TESTING ORDERED:  1. Uterine and fetal monitoring  2. Urinalysis  3. FFN    LAB RESULTS:    No results found for this or any previous visit (from the past 24 hour(s)).    Lab Results   Component Value Date    ABO A 2021    RH Negative 2021       No results found for: STREPGPB              Assessment/Plan     ASSESSMENT/PLAN:  Evie Shi is a 25 y.o. female  at 26w0d who presented with:  contractions.  Abdominal pain consistent with round ligament/musculoskeletal origin and possibly discomfort due to contractions although patient not having pain in a wavelike fashion.  UA and FFN sent for work-up.  FFN negative and UA normal.  Patient given hydration and observed for awhile.  Her cervix was rechecked and found to be unchanged with contractions spaced out.  She was reassured and discharged home with return precautions reviewed.          Final Impression:  • Pregnancy at 26w0d  • Reactive NST.  CAT 1 tracing  • False  labor  • Maternal vital signs were reviewed and were unremarkable              Vitals:    21 0152 21 0156   BP:  109/64   Pulse:  99   Resp: 16    Temp: 98.7 °F (37.1 °C)    TempSrc: Oral    SpO2: 97%    Height: 172 cm (67.72\")    •     • No results found for: STREPGPB  Lab Results   Component Value Date    ABO A 2021    RH Negative 2021   •   • COVID - 19 status unknown      PLAN:       I have spent 30 minutes including face to face time with the patient, greater than 50% in discussion of the diagnosis (counseling) and/or coordination of care.     Cheryl Velasco MD  2021  02:13 EDT  OB Hospitalist  Phone:  [phone]  "

## 2021-06-01 ENCOUNTER — HOSPITAL ENCOUNTER (EMERGENCY)
Facility: HOSPITAL | Age: 26
Discharge: HOME | End: 2021-06-02
Attending: OBSTETRICS & GYNECOLOGY | Admitting: OBSTETRICS & GYNECOLOGY

## 2021-06-01 LAB
BACTERIA UR QL AUTO: ABNORMAL /HPF
BASOPHILS # BLD AUTO: 0.03 10*3/MM3 (ref 0–0.2)
BASOPHILS NFR BLD AUTO: 0.2 % (ref 0–1.5)
BILIRUB UR QL STRIP: NEGATIVE
BLOODY SPECIMEN?: NO
CLARITY UR: CLEAR
COLOR UR: YELLOW
DEPRECATED RDW RBC AUTO: 40.9 FL (ref 37–54)
EOSINOPHIL # BLD AUTO: 0.27 10*3/MM3 (ref 0–0.4)
EOSINOPHIL NFR BLD AUTO: 2.1 % (ref 0.3–6.2)
ERYTHROCYTE [DISTWIDTH] IN BLOOD BY AUTOMATED COUNT: 13.2 % (ref 12.3–15.4)
FIBRONECTIN FETAL VAG QL: NEGATIVE
GLUCOSE UR STRIP-MCNC: NEGATIVE MG/DL
HCT VFR BLD AUTO: 35.7 % (ref 34–46.6)
HGB BLD-MCNC: 11.7 G/DL (ref 12–15.9)
HGB UR QL STRIP.AUTO: ABNORMAL
HYALINE CASTS UR QL AUTO: ABNORMAL /LPF
IMM GRANULOCYTES # BLD AUTO: 0.08 10*3/MM3 (ref 0–0.05)
IMM GRANULOCYTES NFR BLD AUTO: 0.6 % (ref 0–0.5)
KETONES UR QL STRIP: NEGATIVE
LEUKOCYTE ESTERASE UR QL STRIP.AUTO: NEGATIVE
LYMPHOCYTES # BLD AUTO: 2.58 10*3/MM3 (ref 0.7–3.1)
LYMPHOCYTES NFR BLD AUTO: 20.5 % (ref 19.6–45.3)
MCH RBC QN AUTO: 27.9 PG (ref 26.6–33)
MCHC RBC AUTO-ENTMCNC: 32.8 G/DL (ref 31.5–35.7)
MCV RBC AUTO: 85.2 FL (ref 79–97)
MONOCYTES # BLD AUTO: 0.97 10*3/MM3 (ref 0.1–0.9)
MONOCYTES NFR BLD AUTO: 7.7 % (ref 5–12)
NEUTROPHILS NFR BLD AUTO: 68.9 % (ref 42.7–76)
NEUTROPHILS NFR BLD AUTO: 8.66 10*3/MM3 (ref 1.7–7)
NITRITE UR QL STRIP: NEGATIVE
NRBC BLD AUTO-RTO: 0 /100 WBC (ref 0–0.2)
PH UR STRIP.AUTO: 6.5 [PH] (ref 5–8)
PLATELET # BLD AUTO: 238 10*3/MM3 (ref 140–450)
PMV BLD AUTO: 10.3 FL (ref 6–12)
PROT UR QL STRIP: NEGATIVE
RBC # BLD AUTO: 4.19 10*6/MM3 (ref 3.77–5.28)
RBC # UR: ABNORMAL /HPF
REF LAB TEST METHOD: ABNORMAL
SP GR UR STRIP: <=1.005 (ref 1–1.03)
SQUAMOUS #/AREA URNS HPF: ABNORMAL /HPF
UROBILINOGEN UR QL STRIP: ABNORMAL
WBC # BLD AUTO: 12.59 10*3/MM3 (ref 3.4–10.8)
WBC UR QL AUTO: ABNORMAL /HPF

## 2021-06-01 PROCEDURE — 86901 BLOOD TYPING SEROLOGIC RH(D): CPT | Performed by: OBSTETRICS & GYNECOLOGY

## 2021-06-01 PROCEDURE — 86900 BLOOD TYPING SEROLOGIC ABO: CPT | Performed by: OBSTETRICS & GYNECOLOGY

## 2021-06-01 PROCEDURE — 86850 RBC ANTIBODY SCREEN: CPT | Performed by: OBSTETRICS & GYNECOLOGY

## 2021-06-01 PROCEDURE — 85025 COMPLETE CBC W/AUTO DIFF WBC: CPT | Performed by: OBSTETRICS & GYNECOLOGY

## 2021-06-01 PROCEDURE — 82731 ASSAY OF FETAL FIBRONECTIN: CPT | Performed by: OBSTETRICS & GYNECOLOGY

## 2021-06-01 PROCEDURE — 81001 URINALYSIS AUTO W/SCOPE: CPT | Performed by: OBSTETRICS & GYNECOLOGY

## 2021-06-01 PROCEDURE — 87088 URINE BACTERIA CULTURE: CPT | Performed by: OBSTETRICS & GYNECOLOGY

## 2021-06-01 PROCEDURE — 87086 URINE CULTURE/COLONY COUNT: CPT | Performed by: OBSTETRICS & GYNECOLOGY

## 2021-06-01 RX ORDER — SODIUM CHLORIDE 0.9 % (FLUSH) 0.9 %
10 SYRINGE (ML) INJECTION AS NEEDED
Status: DISCONTINUED | OUTPATIENT
Start: 2021-06-01 | End: 2021-06-02 | Stop reason: HOSPADM

## 2021-06-01 RX ORDER — SODIUM CHLORIDE 0.9 % (FLUSH) 0.9 %
10 SYRINGE (ML) INJECTION EVERY 12 HOURS SCHEDULED
Status: DISCONTINUED | OUTPATIENT
Start: 2021-06-02 | End: 2021-06-02 | Stop reason: HOSPADM

## 2021-06-01 RX ADMIN — SODIUM CHLORIDE, POTASSIUM CHLORIDE, SODIUM LACTATE AND CALCIUM CHLORIDE 1000 ML: 600; 310; 30; 20 INJECTION, SOLUTION INTRAVENOUS at 23:39

## 2021-06-02 VITALS
WEIGHT: 179 LBS | HEIGHT: 68 IN | BODY MASS INDEX: 27.13 KG/M2 | TEMPERATURE: 98.1 F | HEART RATE: 99 BPM | DIASTOLIC BLOOD PRESSURE: 59 MMHG | SYSTOLIC BLOOD PRESSURE: 112 MMHG | OXYGEN SATURATION: 100 % | RESPIRATION RATE: 16 BRPM

## 2021-06-02 LAB
ABO GROUP BLD: NORMAL
AMPHET+METHAMPHET UR QL: NEGATIVE
BARBITURATES UR QL SCN: NEGATIVE
BENZODIAZ UR QL SCN: NEGATIVE
BLD GP AB SCN SERPL QL: NEGATIVE
CANNABINOIDS SERPL QL: NEGATIVE
COCAINE UR QL: NEGATIVE
METHADONE UR QL SCN: NEGATIVE
OPIATES UR QL: NEGATIVE
OXYCODONE UR QL SCN: NEGATIVE
RH BLD: NEGATIVE
T&S EXPIRATION DATE: NORMAL

## 2021-06-02 PROCEDURE — 59025 FETAL NON-STRESS TEST: CPT

## 2021-06-02 PROCEDURE — 80307 DRUG TEST PRSMV CHEM ANLYZR: CPT | Performed by: OBSTETRICS & GYNECOLOGY

## 2021-06-02 PROCEDURE — 99285 EMERGENCY DEPT VISIT HI MDM: CPT | Performed by: OBSTETRICS & GYNECOLOGY

## 2021-06-02 RX ORDER — NIFEDIPINE 10 MG/1
10 CAPSULE ORAL ONCE
Status: COMPLETED | OUTPATIENT
Start: 2021-06-02 | End: 2021-06-02

## 2021-06-02 RX ADMIN — NIFEDIPINE 10 MG: 10 CAPSULE ORAL at 00:04

## 2021-06-02 NOTE — OBED NOTES
"[facility]  Evie Shi  : 1995  MRN: [id number]  CSN: [id number]    OB ED Provider Note    Subjective   Chief Complaint   Patient presents with   • Abdominal Cramping     GURJIT-back pain, seems to coincide with one another.  \"Feels like whipping\", not constant, back pain not as bad. Points to middle.  some cva tenderness noted on left side.  Denies leaking/bleeding.  +fm.  Pain started about      Evie Shi is a 25 y.o. year old  with an Estimated Date of Delivery: 21 currently at 28w2d presenting with intermittent right sided abdominal pain radiating into her back since .  The pain occurs multiple times an hour, lasting for 10-15 seconds at a time.  She denies ROM, VB.  FM is present.  No N/V/D/F/C or urinary symptoms. Although her last visit to the [facility] seemed to have very similar symptoms, she reports that this pain feels different.  She is also complaining of very brief, intermittent bilateral calf pin, lasting less than 10 seconds at a time.  She reports no exacerbating or alleviating factors.  All information obtained via  iPad services.    Prenatal care has been with [facility].  It has been benign.     OB History    Para Term  AB Living   1 0 0 0 0 0   SAB TAB Ectopic Molar Multiple Live Births   0 0 0 0 0 0      # Outcome Date GA Lbr Dean/2nd Weight Sex Delivery Anes PTL Lv   1 Current              Past Medical History:   Diagnosis Date   • Polycystic disease, ovaries      No past surgical history on file.  No current facility-administered medications for this encounter.    No Known Allergies  Social History    Tobacco Use      Smoking status: Current Every Day Smoker      Smokeless tobacco: Never Used    Review of Systems   Constitutional: Negative for chills and fever.   Gastrointestinal: Positive for abdominal pain. Negative for diarrhea, nausea and vomiting.   Genitourinary: Negative for difficulty urinating, " dysuria, flank pain and hematuria.   Musculoskeletal: Positive for back pain.   Neurological:        Brief, intermittent bilateral calf pain for 10-15 seconds at a time when she's having abdominal pain   All other systems reviewed and are negative.        Objective   /60   Pulse 82   Temp 98.1 °F (36.7 °C) (Oral)   Resp 16   LMP 2020 (Exact Date)   SpO2 100%   General: well developed; well nourished  no acute distress   Abdomen: soft, mildly tender 10 cm to right and 3 cm inferior to umbilicus over fetal bony parts; no masses  gravid    FHT's: reactive and category 1      Cervix: was checked (by RN): 1 cm / 30 % / Floating unchanged after 2 hours   Presentation: cephalic   Contractions: q 5-7 min with baseline irritability spacing to irregular with irritability   Chest: Unlabored respirations    CV:  RRR   Ext:   No C/C/E   Back: CVA tenderness is present, mild on the right with SI tenderness bilateral        Prenatal Labs  Lab Results   Component Value Date    HGB 13.3 2021    ABSCRN Negative 2021    CHLAMNAA Negative 2021    NGONORRHON Negative 2021       Current Labs Reviewed   UA:    Lab Results   Component Value Date    SQUAMEPIUA 3-6 (A) 2021    SPECGRAVUR <=1.005 2021    KETONESU Negative 2021    BLOODU Small (1+) (A) 2021    LEUKOCYTESUR Negative 2021    NITRITEU Negative 2021    RBCUA 6-12 (A) 2021    WBCUA 3-5 (A) 2021    BACTERIA 2+ (A) 2021     fFN negative     Assessment   1. IUP at 28w2d  2.  CTX- negative fFN and no cervical change  3. Abdominal and back pain- appears to be MSK due to location, lack of other findings     Plan   1. D/C home with PTL precautions.  Return for worsening symptoms, any acute changes.  Keep regularly scheduled prenatal appointments.      Karol Weinstein MD  2021  22:55 EDT

## 2021-06-02 NOTE — NURSING NOTE
Pt discharged.  Instructions were given by MD and RN using  services.  Pt understands and is agreeable.

## 2021-06-03 LAB — BACTERIA SPEC AEROBE CULT: ABNORMAL

## 2021-07-20 ENCOUNTER — HOSPITAL ENCOUNTER (EMERGENCY)
Facility: HOSPITAL | Age: 26
Discharge: HOME | End: 2021-07-20
Attending: OBSTETRICS & GYNECOLOGY | Admitting: OBSTETRICS & GYNECOLOGY

## 2021-07-20 VITALS
WEIGHT: 191 LBS | HEIGHT: 68 IN | RESPIRATION RATE: 16 BRPM | BODY MASS INDEX: 28.95 KG/M2 | TEMPERATURE: 98.4 F | OXYGEN SATURATION: 99 %

## 2021-07-20 LAB
HIV1+2 AB SER QL: NORMAL
RPR SER QL: NORMAL

## 2021-07-20 PROCEDURE — 87491 CHLMYD TRACH DNA AMP PROBE: CPT | Performed by: OBSTETRICS & GYNECOLOGY

## 2021-07-20 PROCEDURE — 59025 FETAL NON-STRESS TEST: CPT

## 2021-07-20 PROCEDURE — 87255 GENET VIRUS ISOLATE HSV: CPT | Performed by: OBSTETRICS & GYNECOLOGY

## 2021-07-20 PROCEDURE — 99283 EMERGENCY DEPT VISIT LOW MDM: CPT | Performed by: OBSTETRICS & GYNECOLOGY

## 2021-07-20 PROCEDURE — 86592 SYPHILIS TEST NON-TREP QUAL: CPT | Performed by: OBSTETRICS & GYNECOLOGY

## 2021-07-20 PROCEDURE — 87591 N.GONORRHOEAE DNA AMP PROB: CPT | Performed by: OBSTETRICS & GYNECOLOGY

## 2021-07-20 PROCEDURE — G0432 EIA HIV-1/HIV-2 SCREEN: HCPCS | Performed by: OBSTETRICS & GYNECOLOGY

## 2021-07-20 NOTE — OBED NOTES
[facility] Note OB        Patient Name: Evie Shi  YOB: 1995  MRN: [id number]  Admission Date: 2021  4:34 PM  Date of Service: 2021    Chief Complaint: Decreased Fetal Movement (patient presents to labor and delivery at 35 weeks gestation with complaints of decreased fetal movement since yesterday and a sore in her vagina)        Subjective     Evie Shi is a 25 y.o. female  at 35w2d with Estimated Date of Delivery: 21 who presents with the chief complaint listed above.  She sees Cheryl Velasco MD for her prenatal care. Her pregnancy has been complicated by:  uncomplicated.    Patient presents complaining of decreased fetal movement and genital sore present on right labia for past week.  She states it is not painful, doesn't itch, and she has no other burning or tingling in her vagina or vulva.  She has not been sexually active this pregnancy.  She is now feeling active movement with the baby.  She is [place]-speaking only and an  was used via iPad for the entire visit.      She describes fetal movement as decreased.  She denies rupture of membranes.  She denies vaginal bleeding. She is feeling contractions.  She reports back pain but denies contractions over abdomen.  She states she was checked recently and was 1 cm dilated.          Objective   There are no problems to display for this patient.       OB History    Para Term  AB Living   1 0 0 0 0 0   SAB TAB Ectopic Molar Multiple Live Births   0 0 0 0 0 0      # Outcome Date GA Lbr Dean/2nd Weight Sex Delivery Anes PTL Lv   1 Current                 Past Medical History:   Diagnosis Date   • Polycystic disease, ovaries        History reviewed. No pertinent surgical history.    No current facility-administered medications on file prior to encounter.     Current Outpatient Medications on File Prior to Encounter   Medication Sig Dispense Refill   • prenatal vitamin (prenatal,  "CLASSIC, vitamin) tablet Take  by mouth Daily.         No Known Allergies    History reviewed. No pertinent family history.    Social History     Socioeconomic History   • Marital status: Single     Spouse name: Not on file   • Number of children: Not on file   • Years of education: Not on file   • Highest education level: Not on file   Tobacco Use   • Smoking status: Current Every Day Smoker   • Smokeless tobacco: Never Used   Vaping Use   • Vaping Use: Never used   Substance and Sexual Activity   • Alcohol use: Not Currently     Comment: occ   • Drug use: Never   • Sexual activity: Yes     Partners: Male           Review of Systems   Constitutional: Negative for chills, fatigue and fever.   HENT: Negative for congestion, rhinorrhea and sore throat.    Eyes: Negative for visual disturbance.   Respiratory: Negative.    Cardiovascular: Negative.    Gastrointestinal: Negative for abdominal pain, constipation, diarrhea, nausea and vomiting.   Genitourinary: Positive for genital sores. Negative for difficulty urinating, dyspareunia, dysuria, flank pain, frequency, hematuria, pelvic pain, urgency, vaginal bleeding, vaginal discharge and vaginal pain.   Neurological: Negative for dizziness, seizures, light-headedness and headaches.   Psychiatric/Behavioral: Negative for sleep disturbance. The patient is not nervous/anxious.           PHYSICAL EXAM:      VITAL SIGNS:  Vitals:    07/20/21 1712   Resp: 16   Temp: 98.4 °F (36.9 °C)   TempSrc: Oral   SpO2: 99%   Weight: 86.6 kg (191 lb)   Height: 172 cm (67.72\")        FHT'S:                   Baseline:  130 BPM  Variability:  Moderate = 6 - 25 BPM  Accelerations:  15 x 15 accelerations present     Decelerations:  absent  Contractions:   present     Interpretation:    Reactive NST, CAT 1 tracing        PHYSICAL EXAM:    General: well developed; well nourished  no acute distress   Heart: Not performed.   Lungs  : breathing is unlabored     Abdomen: soft, non-tender; no " masses  no umbilical or inguinal hernias are present  no hepato-splenomegaly       Cervix: was checked (by me): 1 cm / 60 % / -3   Vulva: shallow, ulcerated well-circumscribed lesion seen in middle of right labia majora.  Nonpainful to touch.  No induration or surrounding redness.  No weeping or discharge noted.  Vulva otherwise normal appearing.        Contractions: irregular        Extremities: peripheral pulses normal, no pedal edema, no clubbing or cyanosis      LABS AND TESTING ORDERED:  1. Uterine and fetal monitoring  2. GC/CT, RPR, HSV culture, HIV    LAB RESULTS:    No results found for this or any previous visit (from the past 24 hour(s)).    Lab Results   Component Value Date    ABO A 2021    RH Negative 2021       No results found for: STREPGPB              Assessment/Plan     ASSESSMENT/PLAN:  Evie Shi is a 25 y.o. female  at 35w2d who presented with: decreased fetal movement, irregular contractions, and vulvar lesion.  Fetal status reassuring with reactive NST.  Patient started to feel movement during triage visit.  Cervix also unchanged from prior exam, so low concern for  labor.  Vulvar lesion is concerning for possible infectious disease such as syphilis or LGV.  HSV also cannot be ruled out although it is not characteristic appearance.  Will assess for infectious disease including HSV with labs ordered as noted above.  Plan to call patient with results whether positive or negative.  She was reassured of fetal status and discharged home with return precautions reviewed.            Final Impression:  • Pregnancy at 35w2d  • Reactive NST.  CAT 1 tracing  • Reassuring fetal status  • False  labor  • Vulvar lesion concerning for STI- work-up pending  • Maternal vital signs were reviewed and were unremarkable              Vitals:    21 1712   Resp: 16   Temp: 98.4 °F (36.9 °C)   TempSrc: Oral   SpO2: 99%   Weight: 86.6 kg (191 lb)   Height: 172 cm  "(67.72\")   •     • No results found for: STREPGPB  Lab Results   Component Value Date    ABO A 06/01/2021    RH Negative 06/01/2021   •   • COVID - 19 status unknown      PLAN:       I have spent 30 minutes including face to face time with the patient, greater than 50% in discussion of the diagnosis (counseling) and/or coordination of care.     Cheryl Velasco MD  7/20/2021  17:32 EDT  OB Hospitalist  Phone:  [phone]  "

## 2021-07-22 LAB
C TRACH RRNA SPEC QL NAA+PROBE: NEGATIVE
HSV SPEC CULT: POSITIVE
N GONORRHOEA RRNA SPEC QL NAA+PROBE: NEGATIVE

## 2021-07-23 PROBLEM — O98.513 HERPES SIMPLEX VIRUS TYPE 2 (HSV-2) INFECTION AFFECTING PREGNANCY IN THIRD TRIMESTER: Chronic | Status: ACTIVE | Noted: 2021-07-23

## 2021-07-23 PROBLEM — B00.9 HERPES SIMPLEX VIRUS TYPE 2 (HSV-2) INFECTION AFFECTING PREGNANCY IN THIRD TRIMESTER: Chronic | Status: ACTIVE | Noted: 2021-07-23

## 2021-07-23 NOTE — PROGRESS NOTES
Patient see in triage for vulvar lesion and RPR, HIV, GC/CT, and HSV culture performed.  Results negative except for positive HSV culture of lesion.  Patient is Welsh-speaking only and was called with aid of [employer].  The diagnosis was relayed to her and patient states this is her first ever outbreak.  We discussed implications for pregnancy including possible need for  delivery if the outbreak doesn't resolve or she has any prodromal symptoms when she goes into labor.  She was prescribed acyclovir 400 mg TID to take for treatment and then to continue for the remainder of her pregnancy.  She states care right now is being transferred to [facility], and she was counseled to tell doctor of her new diagnosis so it can be added other chart.  She was also advised to tell staff on admission to labor and delivery when she is in labor of her diagnosis so we can verify there is no current infection so she can deliver safely.  She agrees to this plan and voiced understanding of diagnosis.  Acyclovir called into [facility] at [facility] at patient's request.

## 2021-08-09 ENCOUNTER — HOSPITAL ENCOUNTER (EMERGENCY)
Facility: HOSPITAL | Age: 26
Discharge: HOME | End: 2021-08-09
Attending: OBSTETRICS & GYNECOLOGY | Admitting: OBSTETRICS & GYNECOLOGY

## 2021-08-09 VITALS
RESPIRATION RATE: 16 BRPM | TEMPERATURE: 98.4 F | HEIGHT: 68 IN | OXYGEN SATURATION: 99 % | BODY MASS INDEX: 28.95 KG/M2 | WEIGHT: 191 LBS

## 2021-08-09 PROCEDURE — 99283 EMERGENCY DEPT VISIT LOW MDM: CPT | Performed by: OBSTETRICS & GYNECOLOGY

## 2021-08-09 PROCEDURE — 59025 FETAL NON-STRESS TEST: CPT

## 2021-08-09 RX ORDER — VALACYCLOVIR HYDROCHLORIDE 1 G/1
1000 TABLET, FILM COATED ORAL 2 TIMES DAILY
COMMUNITY
Start: 2021-07-20

## 2021-08-09 NOTE — OBED NOTES
"[facility]  Evie Shi  : 1995  MRN: [id number]  CSN: [id number]    OB ED Provider Note    Subjective   Chief Complaint   Patient presents with   • Contractions     patient presents to labor and delivery at 38 weeks gestation with complaints of contrctions since last night. and the contractions have become less frequent today      Evie Shi is a 25 y.o. year old  with an Estimated Date of Delivery: 21 currently at 38w1d presenting with irregular CTX since yesterday.  She denies ROM or VB.  FM is present.      P[facility]atal care has been with [facility].  It has been complicated by lack of records availability; primary HSV outbreak in pregnancy, now on acyclovir suppression.    OB History    Para Term  AB Living   1 0 0 0 0 0   SAB TAB Ectopic Molar Multiple Live Births   0 0 0 0 0 0      # Outcome Date GA Lbr Dean/2nd Weight Sex Delivery Anes PTL Lv   1 Current              Past Medical History:   Diagnosis Date   • Herpes     first outbreak this pregnancy   • Polycystic disease, ovaries      No past surgical history on file.  No current facility-administered medications for this encounter.    No Known Allergies  Social History    Tobacco Use      Smoking status: Current Every Day Smoker      Smokeless tobacco: Never Used    Review of Systems   Gastrointestinal: Positive for abdominal pain.   Musculoskeletal: Positive for back pain.   All other systems reviewed and are negative.        Objective   Temp 98.4 °F (36.9 °C) (Oral)   Resp 16   Ht 172 cm (67.72\")   Wt 86.6 kg (191 lb)   LMP 2020 (Exact Date)   SpO2 99%   BMI 29.28 kg/m²   General: well developed; well nourished  no acute distress   Abdomen: soft, 3+ symphyseal tenderness; no masses  gravid    FHT's: reactive and category 1      Cervix: was checked (by RN): 1.5 cm / 70 % / -2, unchanged after 1+ hours; no visible lesions per my exam   Presentation: cephalic   Contractions: " irregular   Chest: Unlabored respirations    CV:  RRR   Ext:   No C/C/E   Back: SI tenderness is present bilateral        Prenatal Labs  Lab Results   Component Value Date    HGB 11.7 (L) 06/01/2021    ABSCRN Negative 06/01/2021    [id number] Non-Reactive 07/20/2021    URINECX 50,000 CFU/mL Lactobacillus species (A) 06/01/2021    CHLAMNAA Negative 07/20/2021    NGONORRHON Negative 07/20/2021       Current Labs Reviewed   UA:    Lab Results   Component Value Date    SQUAMEPIUA 3-6 (A) 06/01/2021    SPECGRAVUR <=1.005 06/01/2021    KETONESU Negative 06/01/2021    BLOODU Small (1+) (A) 06/01/2021    LEUKOCYTESUR Negative 06/01/2021    NITRITEU Negative 06/01/2021    RBCUA 6-12 (A) 06/01/2021    WBCUA 3-5 (A) 06/01/2021    BACTERIA 2+ (A) 06/01/2021          Assessment   1. IUP at 38w1d  2. Irregular CTX- education of labor signs thoroughly explained through  phone     Plan   1. D/C home with labor, pre-eclampsia precautions.  Keep regularly scheduled prenatal appointments.  Return for worsening symptoms or acute changes.      Karol Weinstein MD  8/9/2021  15:16 EDT

## 2021-12-14 ENCOUNTER — HOSPITAL ENCOUNTER (EMERGENCY)
Facility: HOSPITAL | Age: 26
Discharge: LEFT BEFORE DISCHARGE | End: 2021-12-14

## 2021-12-14 VITALS
SYSTOLIC BLOOD PRESSURE: 109 MMHG | OXYGEN SATURATION: 100 % | RESPIRATION RATE: 18 BRPM | TEMPERATURE: 97.7 F | DIASTOLIC BLOOD PRESSURE: 71 MMHG | HEART RATE: 78 BPM

## 2021-12-14 LAB
ALBUMIN SERPL-MCNC: 4 G/DL (ref 3.5–5.2)
ALBUMIN/GLOB SERPL: 1.4 G/DL
ALP SERPL-CCNC: 70 U/L (ref 39–117)
ALT SERPL W P-5'-P-CCNC: 17 U/L (ref 1–33)
ANION GAP SERPL CALCULATED.3IONS-SCNC: 6.8 MMOL/L (ref 5–15)
AST SERPL-CCNC: 15 U/L (ref 1–32)
BACTERIA UR QL AUTO: ABNORMAL /HPF
BASOPHILS # BLD AUTO: 0.02 10*3/MM3 (ref 0–0.2)
BASOPHILS NFR BLD AUTO: 0.2 % (ref 0–1.5)
BILIRUB SERPL-MCNC: 0.3 MG/DL (ref 0–1.2)
BILIRUB UR QL STRIP: NEGATIVE
BUN SERPL-MCNC: 14 MG/DL (ref 6–20)
BUN/CREAT SERPL: 28 (ref 7–25)
CALCIUM SPEC-SCNC: 9 MG/DL (ref 8.6–10.5)
CHLORIDE SERPL-SCNC: 108 MMOL/L (ref 98–107)
CLARITY UR: ABNORMAL
CO2 SERPL-SCNC: 25.2 MMOL/L (ref 22–29)
COLOR UR: YELLOW
CREAT SERPL-MCNC: 0.5 MG/DL (ref 0.57–1)
DEPRECATED RDW RBC AUTO: 37.4 FL (ref 37–54)
EOSINOPHIL # BLD AUTO: 0.07 10*3/MM3 (ref 0–0.4)
EOSINOPHIL NFR BLD AUTO: 0.9 % (ref 0.3–6.2)
ERYTHROCYTE [DISTWIDTH] IN BLOOD BY AUTOMATED COUNT: 12.4 % (ref 12.3–15.4)
GFR SERPL CREATININE-BSD FRML MDRD: >150 ML/MIN/1.73
GLOBULIN UR ELPH-MCNC: 2.8 GM/DL
GLUCOSE SERPL-MCNC: 88 MG/DL (ref 65–99)
GLUCOSE UR STRIP-MCNC: NEGATIVE MG/DL
HCG SERPL QL: NEGATIVE
HCT VFR BLD AUTO: 36.1 % (ref 34–46.6)
HGB BLD-MCNC: 11.8 G/DL (ref 12–15.9)
HGB UR QL STRIP.AUTO: NEGATIVE
HOLD SPECIMEN: NORMAL
HYALINE CASTS UR QL AUTO: ABNORMAL /LPF
IMM GRANULOCYTES # BLD AUTO: 0.02 10*3/MM3 (ref 0–0.05)
IMM GRANULOCYTES NFR BLD AUTO: 0.2 % (ref 0–0.5)
KETONES UR QL STRIP: NEGATIVE
LEUKOCYTE ESTERASE UR QL STRIP.AUTO: ABNORMAL
LIPASE SERPL-CCNC: 16 U/L (ref 13–60)
LYMPHOCYTES # BLD AUTO: 2.58 10*3/MM3 (ref 0.7–3.1)
LYMPHOCYTES NFR BLD AUTO: 31.6 % (ref 19.6–45.3)
MCH RBC QN AUTO: 27.7 PG (ref 26.6–33)
MCHC RBC AUTO-ENTMCNC: 32.7 G/DL (ref 31.5–35.7)
MCV RBC AUTO: 84.7 FL (ref 79–97)
MONOCYTES # BLD AUTO: 0.51 10*3/MM3 (ref 0.1–0.9)
MONOCYTES NFR BLD AUTO: 6.2 % (ref 5–12)
NEUTROPHILS NFR BLD AUTO: 4.97 10*3/MM3 (ref 1.7–7)
NEUTROPHILS NFR BLD AUTO: 60.9 % (ref 42.7–76)
NITRITE UR QL STRIP: NEGATIVE
NRBC BLD AUTO-RTO: 0 /100 WBC (ref 0–0.2)
PH UR STRIP.AUTO: 6 [PH] (ref 5–8)
PLATELET # BLD AUTO: 298 10*3/MM3 (ref 140–450)
PMV BLD AUTO: 10.5 FL (ref 6–12)
POTASSIUM SERPL-SCNC: 4.4 MMOL/L (ref 3.5–5.2)
PROT SERPL-MCNC: 6.8 G/DL (ref 6–8.5)
PROT UR QL STRIP: NEGATIVE
RBC # BLD AUTO: 4.26 10*6/MM3 (ref 3.77–5.28)
RBC # UR STRIP: ABNORMAL /HPF
REF LAB TEST METHOD: ABNORMAL
SODIUM SERPL-SCNC: 140 MMOL/L (ref 136–145)
SP GR UR STRIP: 1.02 (ref 1–1.03)
SQUAMOUS #/AREA URNS HPF: ABNORMAL /HPF
UROBILINOGEN UR QL STRIP: ABNORMAL
WBC # UR STRIP: ABNORMAL /HPF
WBC NRBC COR # BLD: 8.17 10*3/MM3 (ref 3.4–10.8)
WHOLE BLOOD HOLD SPECIMEN: NORMAL
WHOLE BLOOD HOLD SPECIMEN: NORMAL

## 2021-12-14 PROCEDURE — 80053 COMPREHEN METABOLIC PANEL: CPT

## 2021-12-14 PROCEDURE — 81001 URINALYSIS AUTO W/SCOPE: CPT

## 2021-12-14 PROCEDURE — 83690 ASSAY OF LIPASE: CPT

## 2021-12-14 PROCEDURE — 84703 CHORIONIC GONADOTROPIN ASSAY: CPT

## 2021-12-14 PROCEDURE — 85025 COMPLETE CBC W/AUTO DIFF WBC: CPT

## 2021-12-14 PROCEDURE — 36415 COLL VENOUS BLD VENIPUNCTURE: CPT

## 2021-12-14 PROCEDURE — 99211 OFF/OP EST MAY X REQ PHY/QHP: CPT

## 2021-12-14 RX ORDER — SODIUM CHLORIDE 0.9 % (FLUSH) 0.9 %
10 SYRINGE (ML) INJECTION AS NEEDED
Status: DISCONTINUED | OUTPATIENT
Start: 2021-12-14 | End: 2021-12-14 | Stop reason: HOSPADM

## 2021-12-14 NOTE — ED NOTES
Patient from home; triage done using ; patient has missed her period for two weeks and c/o pain in her ovaries. Patient had a vaginal delivery 4 months ago. States that last time she was pregnant she had multiple negative pregnancy tests.      Amarilys Perez RN  12/14/21 [id number]